# Patient Record
Sex: FEMALE | Race: WHITE | Employment: FULL TIME | URBAN - METROPOLITAN AREA
[De-identification: names, ages, dates, MRNs, and addresses within clinical notes are randomized per-mention and may not be internally consistent; named-entity substitution may affect disease eponyms.]

---

## 2022-03-19 PROBLEM — E66.9 OBESITY: Status: ACTIVE | Noted: 2021-08-04

## 2022-03-19 PROBLEM — O41.00X0 OLIGOHYDRAMNIOS: Status: ACTIVE | Noted: 2017-12-20

## 2022-03-20 PROBLEM — D68.51 FACTOR 5 LEIDEN MUTATION, HETEROZYGOUS (HCC): Status: ACTIVE | Noted: 2021-08-04

## 2022-10-19 LAB
ABO, EXTERNAL RESULT: NORMAL
C. TRACHOMATIS, EXTERNAL RESULT: NEGATIVE
HEP B, EXTERNAL RESULT: NON REACTIVE
HEPATITIS C ANTIBODY, EXTERNAL RESULT: NEGATIVE
HIV, EXTERNAL RESULT: NON REACTIVE
N. GONORRHOEAE, EXTERNAL RESULT: NEGATIVE
RH FACTOR, EXTERNAL RESULT: NEGATIVE
RUBELLA TITER, EXTERNAL RESULT: NORMAL

## 2023-04-02 ENCOUNTER — HOSPITAL ENCOUNTER (EMERGENCY)
Age: 33
Discharge: HOME | End: 2023-04-02
Attending: STUDENT IN AN ORGANIZED HEALTH CARE EDUCATION/TRAINING PROGRAM
Payer: COMMERCIAL

## 2023-04-02 DIAGNOSIS — K08.89 DENTALGIA: Primary | ICD-10-CM

## 2023-04-02 PROCEDURE — 74011000250 HC RX REV CODE- 250: Performed by: PHYSICIAN ASSISTANT

## 2023-04-02 PROCEDURE — 75810000283 HC INJECTION NERVE BLOCK

## 2023-04-02 PROCEDURE — 99283 EMERGENCY DEPT VISIT LOW MDM: CPT

## 2023-04-02 RX ORDER — OXYCODONE HYDROCHLORIDE 5 MG/1
5 TABLET ORAL
Qty: 4 TABLET | Refills: 0 | Status: SHIPPED | OUTPATIENT
Start: 2023-04-02 | End: 2023-04-06

## 2023-04-02 RX ORDER — BUPIVACAINE HYDROCHLORIDE 5 MG/ML
5 INJECTION, SOLUTION EPIDURAL; INTRACAUDAL
Status: COMPLETED | OUTPATIENT
Start: 2023-04-02 | End: 2023-04-02

## 2023-04-02 RX ADMIN — BUPIVACAINE HYDROCHLORIDE 25 MG: 5 INJECTION, SOLUTION EPIDURAL; INTRACAUDAL at 11:15

## 2023-04-02 NOTE — ED PROVIDER NOTES
[facility] EMERGENCY DEPT  EMERGENCY DEPARTMENT ENCOUNTER       Pt Name: Flor Bruno  MRN: [id number]  [place] 1990  Date of evaluation: 4/2/2023  Provider: BRAYDEN Mitchell   PCP: Leana Couch MD  Note Started: 10:56 AM 4/2/23     CHIEF COMPLAINT       Chief Complaint   Patient presents with    Dental Pain     Patient reports pain to the upper left teeth, temperature sensitive. HISTORY OF PRESENT ILLNESS: 1 or more elements      History From: Patient  HPI Limitations : None     Flor Bruno is a 34 y.o. female who presents BIB self with CC of left upper dental pain that has been worsening since Thursday. Pain is described as shocklike\" nervelike\" in the left upper premolars, radiating to the entire left side of her face. She also notes extreme hot and cold sensitivity. She is able to tolerate p.o. but the temperature sensitivity makes eating difficult. Denies fevers. Initially the pain was tolerable and controlled with Tylenol. Over the last 2 days the patient reports unbearable pain, unimproved with Tylenol, keeping her awake at night. She reports sleeping only 3 hours over the weekend due to the severity of the pain. She went to emergency dental clinic today but was told they could not doing anything for her as she is 33 weeks pregnant and also on Lovenox for factor V Leiden mutation. Nursing Notes were all reviewed and agreed with or any disagreements were addressed in the HPI. REVIEW OF SYSTEMS      Review of Systems   HENT:  Positive for dental problem. Positives and Pertinent negatives as per HPI.     PAST HISTORY     Past Medical History:  Past Medical History:   Diagnosis Date    Fluid retention in legs     Herpes simplex virus (HSV) infection     PCOS (polycystic ovarian syndrome)        Past Surgical History:  Past Surgical History:   Procedure Laterality Date    HX WISDOM TEETH EXTRACTION         Family History:  Family History   Problem Relation Age of Onset    Hypertension Mother     Other Father        Social History:  Social History     Tobacco Use    Smoking status: Never     Passive exposure: Never    Smokeless tobacco: Never   Vaping Use    Vaping Use: Never used   Substance Use Topics    Alcohol use: No     Comment: \"rare\"    Drug use: Yes     Types: Marijuana       Allergies:  No Known Allergies    CURRENT MEDICATIONS      Discharge Medication List as of 4/2/2023 12:22 PM        CONTINUE these medications which have NOT CHANGED    Details   drospirenone-ethinyl estradioL (NAZARIO) 3-0.02 mg tab Linda (28) 3 mg-0.02 mg tablet   TAKE ONE TABLET BY MOUTH ONE TIME DAILY, Historical Med      drospirenone, contraceptive, (Slynd) 4 mg (28) tab Slynd 4 mg (28) tablet   Take 1 tablet every day by oral route., Historical Med      valACYclovir (VALTREX) 1 gram tablet valacyclovir 1 gram tablet   Take 1 tablet every day by oral route for 5 days. , Historical Med      furosemide (LASIX) 20 mg tablet TAKE 1 TABLET BY MOUTH DAILY AS NEEDED FOR SWELLING, Normal, Disp-30 Tablet, R-2             PHYSICAL EXAM      ED Triage Vitals [04/02/23 1047]   ED Encounter Vitals Group      BP (!) 147/88      Pulse (Heart Rate) 82      Resp Rate 18      Temp 98.6 °F (37 °C)      Temp src       O2 Sat (%) 100 %      Weight 272 lb 0.8 oz      Height 5' 9\"        Physical Exam  Vitals and nursing note reviewed. Constitutional:       General: She is in acute distress (Tearful, in pain). HENT:      Head: Normocephalic and atraumatic. Nose: Nose normal.      Mouth/Throat:      Mouth: Mucous membranes are moist.      Pharynx: Oropharynx is clear. Comments: TTP at tooth #12 and 13. No significant gingival erythema or edema. No drainable abscess. Eyes:      Extraocular Movements: Extraocular movements intact.       Conjunctiva/sclera: Conjunctivae normal.   Pulmonary:      Effort: Pulmonary effort is normal.   Musculoskeletal:      Cervical back: Normal range of motion and neck supple. Skin:     General: Skin is warm and dry. Neurological:      General: No focal deficit present. Mental Status: She is alert and oriented to person, place, and time. Psychiatric:         Mood and Affect: Mood normal.         Behavior: Behavior normal.        DIAGNOSTIC RESULTS   LABS:     No results found for this or any previous visit (from the past 12 hour(s)). EKG: When ordered, EKG's are interpreted by the Emergency Department Physician in the absence of a cardiologist.  Please see their note for interpretation of EKG. RADIOLOGY:  Non-plain film images such as CT, Ultrasound and MRI are read by the radiologist. Plain radiographic images are visualized and preliminarily interpreted by the ED Provider with the below findings:          Interpretation per the Radiologist below, if available at the time of this note:     No results found. PROCEDURES   Unless otherwise noted below, none  Nerve Block    Date/Time: 4/2/2023 12:00 PM  Performed by: Jacinda Centeno  Authorized by: Jacinda Centeno     Consent:     Consent obtained:  Verbal    Consent given by:  Patient    Risks, benefits, and alternatives were discussed: yes      Risks discussed:  Unsuccessful block, pain and bleeding    Alternatives discussed:  Alternative treatment and referral  Universal protocol:     Patient identity confirmed:  Verbally with patient  Indications:     Indications:  Pain relief  Location:     Nerve block body site: Tooth #12 and tooth #13.     Laterality:  Left  Procedure details:     Block needle gauge:  27 G    Anesthetic injected:  Bupivacaine 0.5% w/o epi    Steroid injected:  None    Additive injected:  None    Injection procedure:  Anatomic landmarks identified, anatomic landmarks palpated, introduced needle, incremental injection and negative aspiration for blood    Paresthesia:  Prolonged  Post-procedure details:     Dressing:  None    Outcome:  Pain relieved    Procedure completion: Tolerated well, no immediate complications     CRITICAL CARE TIME       EMERGENCY DEPARTMENT COURSE and DIFFERENTIAL DIAGNOSIS/MDM   Vitals:    Vitals:    04/02/23 1047   BP: (!) 147/88   Pulse: 82   Resp: 18   Temp: 98.6 °F (37 °C)   SpO2: 100%   Weight: 123.4 kg (272 lb 0.8 oz)   Height: 5' 9\" (1.753 m)        Patient was given the following medications:  Medications   BUPivacaine (PF) (MARCAINE) 0.5 % (5 mg/mL) injection 25 mg (25 mg Peripheral Nerve Block Given by Provider 4/2/23 1115)       CONSULTS: (Who and What was discussed)  None    Chronic Conditions: Pregnancy, anticoagulation for factor V Leiden mutation    Social Determinants affecting Dx or Tx: None    Records Reviewed (source and summary of external records): None    CC/HPI Summary, DDx, ED Course, and Reassessment:     51-year-old female pregnant at 35 weeks gestation anticoagulated on Lovenox presents with worsening dental pain of the last few days. She is tearful and appears very uncomfortable on exam.  No obvious infection. She is agreeable to a dental block. ED Course as of 04/02/23 1637   Sun Apr 02, 2023   1156 Patient reassessed. She is reporting complete symptom resolution after bupivacaine injection. We will discharge the patient with plans for outpatient dental follow-up tomorrow. We discussed pain control for when this dental block wears off. I encouraged her to try 1 g of Tylenol. If this is not sufficient, she may take an oxycodone as needed. We discussed the risk/benefits just side effects of narcotics in pregnancy. We will defer antibiotics at this time as there is no obvious source of infection and the patient is confident she will be able to see her dentist tomorrow.  [AS]      ED Course User Index  [AS] BRAYDEN Mcallister       Disposition Considerations (Tests not done, Shared Decision Making, Pt Expectation of Test or Tx.):      FINAL IMPRESSION     1.  Dentalgia          DISPOSITION/PLAN   Discharged    Discharge Note: The patient is stable for discharge home. The signs, symptoms, diagnosis, and discharge instructions have been discussed, understanding conveyed, and agreed upon. The patient is to follow up as recommended or return to ER should their symptoms worsen. PATIENT REFERRED TO:  Follow-up Information       Follow up With Specialties Details Why Contact Info    [facility] EMERGENCY DEPT Emergency Medicine  As needed, If symptoms worsen [address] [place] [address]  [phone]    Your Dentist  Call  For follow up               DISCHARGE MEDICATIONS:  Discharge Medication List as of 4/2/2023 12:22 PM        START taking these medications    Details   oxyCODONE IR (ROXICODONE) 5 mg immediate release tablet Take 1 Tablet by mouth every six (6) hours as needed for Pain for up to 4 days. Max Daily Amount: 20 mg., Normal, Disp-4 Tablet, R-0           CONTINUE these medications which have NOT CHANGED    Details   drospirenone-ethinyl estradioL (NAZARIO) 3-0.02 mg tab Linda (28) 3 mg-0.02 mg tablet   TAKE ONE TABLET BY MOUTH ONE TIME DAILY, Historical Med      drospirenone, contraceptive, (Slynd) 4 mg (28) tab Slynd 4 mg (28) tablet   Take 1 tablet every day by oral route., Historical Med      valACYclovir (VALTREX) 1 gram tablet valacyclovir 1 gram tablet   Take 1 tablet every day by oral route for 5 days. , Historical Med      furosemide (LASIX) 20 mg tablet TAKE 1 TABLET BY MOUTH DAILY AS NEEDED FOR SWELLING, Normal, Disp-30 Tablet, R-2               DISCONTINUED MEDICATIONS:  Discharge Medication List as of 4/2/2023 12:22 PM          ED Attending Involvement : I have seen and evaluated the patient. My supervision physician was available for consultation. I am the Primary Clinician of Record. Raghav Robles (electronically signed)    (Please note that parts of this dictation were completed with voice recognition software.  Quite often unanticipated grammatical, syntax, homophones, and other interpretive errors are inadvertently transcribed by the computer software. Please disregards these errors.  Please excuse any errors that have escaped final proofreading.)

## 2023-04-25 LAB — GBS, EXTERNAL RESULT: POSITIVE

## 2023-05-16 ENCOUNTER — HOSPITAL ENCOUNTER (INPATIENT)
Facility: HOSPITAL | Age: 33
LOS: 4 days | Discharge: HOME | End: 2023-05-20
Attending: OBSTETRICS & GYNECOLOGY | Admitting: STUDENT IN AN ORGANIZED HEALTH CARE EDUCATION/TRAINING PROGRAM
Payer: COMMERCIAL

## 2023-05-16 ENCOUNTER — ANESTHESIA EVENT (OUTPATIENT)
Dept: LABOR AND DELIVERY | Facility: HOSPITAL | Age: 33
End: 2023-05-16
Payer: COMMERCIAL

## 2023-05-16 ENCOUNTER — ANESTHESIA (OUTPATIENT)
Dept: LABOR AND DELIVERY | Facility: HOSPITAL | Age: 33
End: 2023-05-16
Payer: COMMERCIAL

## 2023-05-16 DIAGNOSIS — G89.18 POST-OP PAIN: Primary | ICD-10-CM

## 2023-05-16 PROBLEM — Z34.90 TERM PREGNANCY: Status: ACTIVE | Noted: 2023-05-16

## 2023-05-16 LAB
ABO + RH BLD: NORMAL
AMPHET UR QL SCN: NEGATIVE
BARBITURATES UR QL SCN: NEGATIVE
BASOPHILS # BLD: 0 K/UL (ref 0–0.1)
BASOPHILS NFR BLD: 0 % (ref 0–1)
BENZODIAZ UR QL: NEGATIVE
BLOOD GROUP ANTIBODIES SERPL: NORMAL
CANNABINOIDS UR QL SCN: POSITIVE
COCAINE UR QL SCN: NEGATIVE
DIFFERENTIAL METHOD BLD: ABNORMAL
EOSINOPHIL # BLD: 0.1 K/UL (ref 0–0.4)
EOSINOPHIL NFR BLD: 1 % (ref 0–7)
ERYTHROCYTE [DISTWIDTH] IN BLOOD BY AUTOMATED COUNT: 15.7 % (ref 11.5–14.5)
HCT VFR BLD AUTO: 36 % (ref 35–47)
HGB BLD-MCNC: 11.4 G/DL (ref 11.5–16)
IMM GRANULOCYTES # BLD AUTO: 0.1 K/UL (ref 0–0.04)
IMM GRANULOCYTES NFR BLD AUTO: 1 % (ref 0–0.5)
LYMPHOCYTES # BLD: 1.8 K/UL (ref 0.8–3.5)
LYMPHOCYTES NFR BLD: 16 % (ref 12–49)
Lab: ABNORMAL
MCH RBC QN AUTO: 27.7 PG (ref 26–34)
MCHC RBC AUTO-ENTMCNC: 31.7 G/DL (ref 30–36.5)
MCV RBC AUTO: 87.4 FL (ref 80–99)
METHADONE UR QL: NEGATIVE
MONOCYTES # BLD: 0.5 K/UL (ref 0–1)
MONOCYTES NFR BLD: 4 % (ref 5–13)
NEUTS SEG # BLD: 8.7 K/UL (ref 1.8–8)
NEUTS SEG NFR BLD: 78 % (ref 32–75)
NRBC # BLD: 0 K/UL (ref 0–0.01)
NRBC BLD-RTO: 0 PER 100 WBC
OPIATES UR QL: NEGATIVE
PCP UR QL: NEGATIVE
PLATELET # BLD AUTO: 220 K/UL (ref 150–400)
PMV BLD AUTO: 11.7 FL (ref 8.9–12.9)
RBC # BLD AUTO: 4.12 M/UL (ref 3.8–5.2)
SPECIMEN EXP DATE BLD: NORMAL
WBC # BLD AUTO: 11.2 K/UL (ref 3.6–11)

## 2023-05-16 PROCEDURE — 6370000000 HC RX 637 (ALT 250 FOR IP): Performed by: STUDENT IN AN ORGANIZED HEALTH CARE EDUCATION/TRAINING PROGRAM

## 2023-05-16 PROCEDURE — 1120000000 HC RM PRIVATE OB

## 2023-05-16 PROCEDURE — 86901 BLOOD TYPING SEROLOGIC RH(D): CPT

## 2023-05-16 PROCEDURE — 2580000003 HC RX 258: Performed by: STUDENT IN AN ORGANIZED HEALTH CARE EDUCATION/TRAINING PROGRAM

## 2023-05-16 PROCEDURE — 85025 COMPLETE CBC W/AUTO DIFF WBC: CPT

## 2023-05-16 PROCEDURE — 6360000002 HC RX W HCPCS: Performed by: OBSTETRICS & GYNECOLOGY

## 2023-05-16 PROCEDURE — 6360000002 HC RX W HCPCS: Performed by: ANESTHESIOLOGY

## 2023-05-16 PROCEDURE — 86900 BLOOD TYPING SEROLOGIC ABO: CPT

## 2023-05-16 PROCEDURE — 7210000100 HC LABOR FEE PER 1 HR

## 2023-05-16 PROCEDURE — 86850 RBC ANTIBODY SCREEN: CPT

## 2023-05-16 PROCEDURE — 36415 COLL VENOUS BLD VENIPUNCTURE: CPT

## 2023-05-16 PROCEDURE — 80307 DRUG TEST PRSMV CHEM ANLYZR: CPT

## 2023-05-16 PROCEDURE — 6360000002 HC RX W HCPCS: Performed by: STUDENT IN AN ORGANIZED HEALTH CARE EDUCATION/TRAINING PROGRAM

## 2023-05-16 RX ORDER — ONDANSETRON 2 MG/ML
4 INJECTION INTRAMUSCULAR; INTRAVENOUS EVERY 6 HOURS PRN
Status: DISCONTINUED | OUTPATIENT
Start: 2023-05-16 | End: 2023-05-17

## 2023-05-16 RX ORDER — NALBUPHINE HCL 10 MG/ML
10 AMPUL (ML) INJECTION EVERY 4 HOURS PRN
Status: DISCONTINUED | OUTPATIENT
Start: 2023-05-16 | End: 2023-05-17

## 2023-05-16 RX ORDER — FERROUS SULFATE 137(45) MG
142 TABLET, EXTENDED RELEASE ORAL DAILY
COMMUNITY

## 2023-05-16 RX ORDER — METHYLERGONOVINE MALEATE 0.2 MG/ML
200 INJECTION INTRAVENOUS PRN
Status: DISCONTINUED | OUTPATIENT
Start: 2023-05-16 | End: 2023-05-17

## 2023-05-16 RX ORDER — MISOPROSTOL 200 UG/1
800 TABLET ORAL PRN
Status: DISCONTINUED | OUTPATIENT
Start: 2023-05-16 | End: 2023-05-17

## 2023-05-16 RX ORDER — FAMOTIDINE 20 MG/1
20 TABLET, FILM COATED ORAL 2 TIMES DAILY
Status: DISCONTINUED | OUTPATIENT
Start: 2023-05-16 | End: 2023-05-17

## 2023-05-16 RX ORDER — ENOXAPARIN SODIUM 100 MG/ML
INJECTION SUBCUTANEOUS 2 TIMES DAILY
Status: ON HOLD | COMMUNITY
End: 2023-05-20 | Stop reason: SDUPTHER

## 2023-05-16 RX ORDER — SODIUM CHLORIDE 9 MG/ML
25 INJECTION, SOLUTION INTRAVENOUS PRN
Status: DISCONTINUED | OUTPATIENT
Start: 2023-05-16 | End: 2023-05-17

## 2023-05-16 RX ORDER — FENTANYL 0.2 MG/100ML-BUPIV 0.125%-NACL 0.9% EPIDURAL INJ 2/0.125 MCG/ML-%
12 SOLUTION INJECTION CONTINUOUS
Status: DISCONTINUED | OUTPATIENT
Start: 2023-05-17 | End: 2023-05-17

## 2023-05-16 RX ORDER — FENTANYL 0.2 MG/100ML-BUPIV 0.125%-NACL 0.9% EPIDURAL INJ 2/0.125 MCG/ML-%
SOLUTION INJECTION
Status: COMPLETED
Start: 2023-05-16 | End: 2023-05-17

## 2023-05-16 RX ORDER — ACETAMINOPHEN 325 MG/1
650 TABLET ORAL EVERY 4 HOURS PRN
Status: DISCONTINUED | OUTPATIENT
Start: 2023-05-16 | End: 2023-05-17

## 2023-05-16 RX ORDER — SODIUM CHLORIDE, SODIUM LACTATE, POTASSIUM CHLORIDE, AND CALCIUM CHLORIDE .6; .31; .03; .02 G/100ML; G/100ML; G/100ML; G/100ML
500 INJECTION, SOLUTION INTRAVENOUS PRN
Status: DISCONTINUED | OUTPATIENT
Start: 2023-05-16 | End: 2023-05-17

## 2023-05-16 RX ORDER — SODIUM CHLORIDE 0.9 % (FLUSH) 0.9 %
5-40 SYRINGE (ML) INJECTION PRN
Status: DISCONTINUED | OUTPATIENT
Start: 2023-05-16 | End: 2023-05-17

## 2023-05-16 RX ORDER — SODIUM CHLORIDE 0.9 % (FLUSH) 0.9 %
5-40 SYRINGE (ML) INJECTION EVERY 12 HOURS SCHEDULED
Status: DISCONTINUED | OUTPATIENT
Start: 2023-05-16 | End: 2023-05-17

## 2023-05-16 RX ORDER — BUPIVACAINE HYDROCHLORIDE AND EPINEPHRINE 2.5; 5 MG/ML; UG/ML
INJECTION, SOLUTION EPIDURAL; INFILTRATION; INTRACAUDAL; PERINEURAL
Status: COMPLETED
Start: 2023-05-16 | End: 2023-05-17

## 2023-05-16 RX ORDER — SODIUM CHLORIDE, SODIUM LACTATE, POTASSIUM CHLORIDE, AND CALCIUM CHLORIDE .6; .31; .03; .02 G/100ML; G/100ML; G/100ML; G/100ML
1000 INJECTION, SOLUTION INTRAVENOUS PRN
Status: DISCONTINUED | OUTPATIENT
Start: 2023-05-16 | End: 2023-05-17

## 2023-05-16 RX ORDER — BUTORPHANOL TARTRATE 1 MG/ML
1 INJECTION, SOLUTION INTRAMUSCULAR; INTRAVENOUS
Status: DISCONTINUED | OUTPATIENT
Start: 2023-05-16 | End: 2023-05-17

## 2023-05-16 RX ORDER — SODIUM CHLORIDE, SODIUM LACTATE, POTASSIUM CHLORIDE, CALCIUM CHLORIDE 600; 310; 30; 20 MG/100ML; MG/100ML; MG/100ML; MG/100ML
INJECTION, SOLUTION INTRAVENOUS CONTINUOUS
Status: DISCONTINUED | OUTPATIENT
Start: 2023-05-16 | End: 2023-05-17

## 2023-05-16 RX ORDER — DOCUSATE SODIUM 100 MG/1
100 CAPSULE, LIQUID FILLED ORAL 2 TIMES DAILY
Status: DISCONTINUED | OUTPATIENT
Start: 2023-05-16 | End: 2023-05-17

## 2023-05-16 RX ORDER — ASPIRIN 81 MG/1
81 TABLET, CHEWABLE ORAL DAILY
Status: ON HOLD | COMMUNITY
End: 2023-05-20 | Stop reason: HOSPADM

## 2023-05-16 RX ORDER — NALOXONE HYDROCHLORIDE 0.4 MG/ML
INJECTION, SOLUTION INTRAMUSCULAR; INTRAVENOUS; SUBCUTANEOUS PRN
Status: DISCONTINUED | OUTPATIENT
Start: 2023-05-16 | End: 2023-05-17

## 2023-05-16 RX ORDER — CARBOPROST TROMETHAMINE 250 UG/ML
250 INJECTION, SOLUTION INTRAMUSCULAR PRN
Status: DISCONTINUED | OUTPATIENT
Start: 2023-05-16 | End: 2023-05-17

## 2023-05-16 RX ADMIN — FAMOTIDINE 20 MG: 20 TABLET, FILM COATED ORAL at 21:17

## 2023-05-16 RX ADMIN — SODIUM CHLORIDE 2.5 MILLION UNITS: 9 INJECTION, SOLUTION INTRAVENOUS at 21:18

## 2023-05-16 RX ADMIN — ONDANSETRON 4 MG: 2 INJECTION INTRAMUSCULAR; INTRAVENOUS at 22:50

## 2023-05-16 RX ADMIN — SODIUM CHLORIDE, POTASSIUM CHLORIDE, SODIUM LACTATE AND CALCIUM CHLORIDE 1000 ML: 600; 310; 30; 20 INJECTION, SOLUTION INTRAVENOUS at 23:15

## 2023-05-16 RX ADMIN — ONDANSETRON 4 MG: 2 INJECTION INTRAMUSCULAR; INTRAVENOUS at 23:20

## 2023-05-16 RX ADMIN — ONDANSETRON 4 MG: 2 INJECTION INTRAMUSCULAR; INTRAVENOUS at 18:07

## 2023-05-16 RX ADMIN — ACETAMINOPHEN 650 MG: 325 TABLET ORAL at 18:17

## 2023-05-16 RX ADMIN — NALBUPHINE HYDROCHLORIDE 10 MG: 10 INJECTION, SOLUTION INTRAMUSCULAR; INTRAVENOUS; SUBCUTANEOUS at 20:32

## 2023-05-16 RX ADMIN — SODIUM CHLORIDE 5 MILLION UNITS: 900 INJECTION INTRAVENOUS at 18:00

## 2023-05-16 RX ADMIN — DOCUSATE SODIUM 100 MG: 100 CAPSULE, LIQUID FILLED ORAL at 21:17

## 2023-05-16 ASSESSMENT — PAIN DESCRIPTION - ORIENTATION
ORIENTATION: LOWER
ORIENTATION: LOWER

## 2023-05-16 ASSESSMENT — PAIN DESCRIPTION - LOCATION
LOCATION: ABDOMEN
LOCATION: ABDOMEN;BACK

## 2023-05-16 ASSESSMENT — PAIN SCALES - GENERAL
PAINLEVEL_OUTOF10: 6
PAINLEVEL_OUTOF10: 6

## 2023-05-16 ASSESSMENT — PAIN DESCRIPTION - DESCRIPTORS
DESCRIPTORS: ACHING;CRAMPING;SHARP;SHOOTING
DESCRIPTORS: CRAMPING

## 2023-05-16 NOTE — H&P
Department of Obstetrics and Gynecology  Attending Obstetrics History and Physical        CHIEF COMPLAINT:  IOL     HISTORY OF PRESENT ILLNESS:      This is a 34 y.o.  39w3d presenting for IOL for oligohydramnios. JOSSELYN noted to be 4.5 in the office today. Denies LoF or VB. No ctx. Pregnancy problems:   HSV- compliant with Valtrex  Obesity   Factor V Leiden- stopped lovenox Saturday, stopped ASA last night   Rh neg  GBS pos no allergies    Prenatal record reviewed from 10/19/22-23        PAST OB HISTORY    OB History    Para Term  AB Living   3 1 1   1 1   SAB IAB Ectopic Molar Multiple Live Births   1         1      # Outcome Date GA Lbr Jayden/2nd Weight Sex Delivery Anes PTL Lv   3 Current            2 Term 17 40w3d   F Vag-Spont   [facility]   1 SAB 10/2016                 Past Medical History:        Diagnosis Date    Anemia     w. pregnancy    Factor V Leiden ([facility] [address]     Fluid retention in legs     Herpes simplex virus (HSV) infection     PCOS (polycystic ovarian syndrome)      Past Surgical History:        Procedure Laterality Date    WISDOM TOOTH EXTRACTION       Family History:       Problem Relation Age of Onset    Hypertension Mother     Other Father      Medications Prior to Admission:  Medications Prior to Admission: aspirin 81 MG chewable tablet, Take 1 tablet by mouth daily  ferrous sulfate (SLOW FE) 142 (45 Fe) MG extended release tablet, Take 142 mg by mouth daily  enoxaparin (LOVENOX) 40 MG/0.4ML, Inject into the skin 2 times daily  Drospirenone (SLYND) 4 MG TABS, Slynd 4 mg (28) tablet  Take 1 tablet every day by oral route.   drospirenone-ethinyl estradiol (KELLY) 3-0.02 MG per tablet, Esperanza (28) 3 mg-0.02 mg tablet  TAKE ONE TABLET BY MOUTH ONE TIME DAILY  furosemide (LASIX) 20 MG tablet, TAKE 1 TABLET BY MOUTH DAILY AS NEEDED FOR SWELLING (Patient not taking: Reported on 2023)  valACYclovir (VALTREX) 1 g tablet, valacyclovir 1 gram tablet  Take 1 tablet every day by oral

## 2023-05-17 LAB
ERYTHROCYTE [DISTWIDTH] IN BLOOD BY AUTOMATED COUNT: 15.6 % (ref 11.5–14.5)
HCT VFR BLD AUTO: 31 % (ref 35–47)
HGB BLD-MCNC: 9.8 G/DL (ref 11.5–16)
MCH RBC QN AUTO: 27.3 PG (ref 26–34)
MCHC RBC AUTO-ENTMCNC: 31.6 G/DL (ref 30–36.5)
MCV RBC AUTO: 86.4 FL (ref 80–99)
NRBC # BLD: 0 K/UL (ref 0–0.01)
NRBC BLD-RTO: 0 PER 100 WBC
PLATELET # BLD AUTO: 173 K/UL (ref 150–400)
PMV BLD AUTO: 11.8 FL (ref 8.9–12.9)
RBC # BLD AUTO: 3.59 M/UL (ref 3.8–5.2)
WBC # BLD AUTO: 16.8 K/UL (ref 3.6–11)

## 2023-05-17 PROCEDURE — 2580000003 HC RX 258: Performed by: OBSTETRICS & GYNECOLOGY

## 2023-05-17 PROCEDURE — 3700000001 HC ADD 15 MINUTES (ANESTHESIA): Performed by: OBSTETRICS & GYNECOLOGY

## 2023-05-17 PROCEDURE — 3700000156 HC EPIDURAL ANESTHESIA

## 2023-05-17 PROCEDURE — 6360000002 HC RX W HCPCS: Performed by: OBSTETRICS & GYNECOLOGY

## 2023-05-17 PROCEDURE — 7100000000 HC PACU RECOVERY - FIRST 15 MIN

## 2023-05-17 PROCEDURE — 7100000001 HC PACU RECOVERY - ADDTL 15 MIN: Performed by: OBSTETRICS & GYNECOLOGY

## 2023-05-17 PROCEDURE — 2500000003 HC RX 250 WO HCPCS: Performed by: ANESTHESIOLOGY

## 2023-05-17 PROCEDURE — 3700000025 EPIDURAL BLOCK: Performed by: ANESTHESIOLOGY

## 2023-05-17 PROCEDURE — 6360000002 HC RX W HCPCS

## 2023-05-17 PROCEDURE — 7100000000 HC PACU RECOVERY - FIRST 15 MIN: Performed by: OBSTETRICS & GYNECOLOGY

## 2023-05-17 PROCEDURE — 6360000002 HC RX W HCPCS: Performed by: ANESTHESIOLOGY

## 2023-05-17 PROCEDURE — 2580000003 HC RX 258: Performed by: STUDENT IN AN ORGANIZED HEALTH CARE EDUCATION/TRAINING PROGRAM

## 2023-05-17 PROCEDURE — 7210000100 HC LABOR FEE PER 1 HR

## 2023-05-17 PROCEDURE — C1713 ANCHOR/SCREW BN/BN,TIS/BN: HCPCS | Performed by: OBSTETRICS & GYNECOLOGY

## 2023-05-17 PROCEDURE — 36415 COLL VENOUS BLD VENIPUNCTURE: CPT

## 2023-05-17 PROCEDURE — 2709999900 HC NON-CHARGEABLE SUPPLY: Performed by: OBSTETRICS & GYNECOLOGY

## 2023-05-17 PROCEDURE — 59200 INSERT CERVICAL DILATOR: CPT

## 2023-05-17 PROCEDURE — 3609079900 HC CESAREAN SECTION: Performed by: OBSTETRICS & GYNECOLOGY

## 2023-05-17 PROCEDURE — 85027 COMPLETE CBC AUTOMATED: CPT

## 2023-05-17 PROCEDURE — 7100000001 HC PACU RECOVERY - ADDTL 15 MIN

## 2023-05-17 PROCEDURE — 3700000000 HC ANESTHESIA ATTENDED CARE: Performed by: OBSTETRICS & GYNECOLOGY

## 2023-05-17 PROCEDURE — 6360000002 HC RX W HCPCS: Performed by: STUDENT IN AN ORGANIZED HEALTH CARE EDUCATION/TRAINING PROGRAM

## 2023-05-17 PROCEDURE — 99465 NB RESUSCITATION: CPT

## 2023-05-17 PROCEDURE — 2500000003 HC RX 250 WO HCPCS

## 2023-05-17 PROCEDURE — 1120000000 HC RM PRIVATE OB

## 2023-05-17 PROCEDURE — 6370000000 HC RX 637 (ALT 250 FOR IP): Performed by: OBSTETRICS & GYNECOLOGY

## 2023-05-17 RX ORDER — FUROSEMIDE 10 MG/ML
20 INJECTION INTRAMUSCULAR; INTRAVENOUS ONCE
Status: COMPLETED | OUTPATIENT
Start: 2023-05-17 | End: 2023-05-17

## 2023-05-17 RX ORDER — SODIUM CHLORIDE, SODIUM LACTATE, POTASSIUM CHLORIDE, CALCIUM CHLORIDE 600; 310; 30; 20 MG/100ML; MG/100ML; MG/100ML; MG/100ML
INJECTION, SOLUTION INTRAVENOUS CONTINUOUS
Status: DISCONTINUED | OUTPATIENT
Start: 2023-05-17 | End: 2023-05-19

## 2023-05-17 RX ORDER — BUPIVACAINE HYDROCHLORIDE AND EPINEPHRINE 2.5; 5 MG/ML; UG/ML
INJECTION, SOLUTION EPIDURAL; INFILTRATION; INTRACAUDAL; PERINEURAL PRN
Status: DISCONTINUED | OUTPATIENT
Start: 2023-05-17 | End: 2023-05-17 | Stop reason: SDUPTHER

## 2023-05-17 RX ORDER — OXYCODONE HYDROCHLORIDE 5 MG/1
5 TABLET ORAL EVERY 4 HOURS PRN
Status: DISCONTINUED | OUTPATIENT
Start: 2023-05-17 | End: 2023-05-20 | Stop reason: HOSPADM

## 2023-05-17 RX ORDER — LANOLIN/MINERAL OIL
LOTION (ML) TOPICAL
Status: DISCONTINUED | OUTPATIENT
Start: 2023-05-17 | End: 2023-05-20 | Stop reason: HOSPADM

## 2023-05-17 RX ORDER — ENOXAPARIN SODIUM 100 MG/ML
30 INJECTION SUBCUTANEOUS EVERY 12 HOURS
Status: DISCONTINUED | OUTPATIENT
Start: 2023-05-18 | End: 2023-05-20 | Stop reason: HOSPADM

## 2023-05-17 RX ORDER — CEFAZOLIN SODIUM 1 G/3ML
INJECTION, POWDER, FOR SOLUTION INTRAMUSCULAR; INTRAVENOUS
Status: COMPLETED
Start: 2023-05-17 | End: 2023-05-17

## 2023-05-17 RX ORDER — DOCUSATE SODIUM 100 MG/1
100 CAPSULE, LIQUID FILLED ORAL 2 TIMES DAILY
Status: DISCONTINUED | OUTPATIENT
Start: 2023-05-17 | End: 2023-05-20 | Stop reason: HOSPADM

## 2023-05-17 RX ORDER — PHENYLEPHRINE HYDROCHLORIDE 10 MG/ML
INJECTION INTRAVENOUS PRN
Status: DISCONTINUED | OUTPATIENT
Start: 2023-05-17 | End: 2023-05-17 | Stop reason: SDUPTHER

## 2023-05-17 RX ORDER — SODIUM CHLORIDE 9 MG/ML
INJECTION, SOLUTION INTRAVENOUS PRN
Status: DISCONTINUED | OUTPATIENT
Start: 2023-05-17 | End: 2023-05-19

## 2023-05-17 RX ORDER — TERBUTALINE SULFATE 1 MG/ML
0.25 INJECTION, SOLUTION SUBCUTANEOUS ONCE
Status: COMPLETED | OUTPATIENT
Start: 2023-05-17 | End: 2023-05-17

## 2023-05-17 RX ORDER — SODIUM CHLORIDE 0.9 % (FLUSH) 0.9 %
5-40 SYRINGE (ML) INJECTION PRN
Status: DISCONTINUED | OUTPATIENT
Start: 2023-05-17 | End: 2023-05-19

## 2023-05-17 RX ORDER — IBUPROFEN 400 MG/1
800 TABLET ORAL EVERY 8 HOURS PRN
Status: DISCONTINUED | OUTPATIENT
Start: 2023-05-17 | End: 2023-05-20 | Stop reason: HOSPADM

## 2023-05-17 RX ORDER — LIDOCAINE HYDROCHLORIDE AND EPINEPHRINE 20; 5 MG/ML; UG/ML
INJECTION, SOLUTION EPIDURAL; INFILTRATION; INTRACAUDAL; PERINEURAL PRN
Status: DISCONTINUED | OUTPATIENT
Start: 2023-05-17 | End: 2023-05-17 | Stop reason: SDUPTHER

## 2023-05-17 RX ORDER — ACETAMINOPHEN 325 MG/1
650 TABLET ORAL EVERY 4 HOURS PRN
Status: DISCONTINUED | OUTPATIENT
Start: 2023-05-17 | End: 2023-05-20 | Stop reason: HOSPADM

## 2023-05-17 RX ORDER — SODIUM CHLORIDE 0.9 % (FLUSH) 0.9 %
5-40 SYRINGE (ML) INJECTION EVERY 12 HOURS SCHEDULED
Status: DISCONTINUED | OUTPATIENT
Start: 2023-05-17 | End: 2023-05-19

## 2023-05-17 RX ADMIN — PHENYLEPHRINE HYDROCHLORIDE 40 MCG: 10 INJECTION INTRAVENOUS at 10:48

## 2023-05-17 RX ADMIN — BUPIVACAINE HYDROCHLORIDE AND EPINEPHRINE 5 ML: 2.5; 5 INJECTION, SOLUTION EPIDURAL; INFILTRATION; INTRACAUDAL; PERINEURAL at 00:17

## 2023-05-17 RX ADMIN — PROMETHAZINE HYDROCHLORIDE 12.5 MG: 25 INJECTION INTRAMUSCULAR; INTRAVENOUS at 12:07

## 2023-05-17 RX ADMIN — CEFAZOLIN 2 MG: 1 INJECTION, POWDER, FOR SOLUTION INTRAMUSCULAR; INTRAVENOUS; PARENTERAL at 10:27

## 2023-05-17 RX ADMIN — ACETAMINOPHEN 650 MG: 325 TABLET ORAL at 15:58

## 2023-05-17 RX ADMIN — LIDOCAINE HYDROCHLORIDE,EPINEPHRINE BITARTRATE 10 ML: 20; .005 INJECTION, SOLUTION EPIDURAL; INFILTRATION; INTRACAUDAL; PERINEURAL at 10:27

## 2023-05-17 RX ADMIN — SODIUM CHLORIDE, POTASSIUM CHLORIDE, SODIUM LACTATE AND CALCIUM CHLORIDE: 600; 310; 30; 20 INJECTION, SOLUTION INTRAVENOUS at 10:26

## 2023-05-17 RX ADMIN — Medication 12 ML/HR: at 07:18

## 2023-05-17 RX ADMIN — Medication 12 ML/HR: at 00:35

## 2023-05-17 RX ADMIN — SODIUM CHLORIDE, POTASSIUM CHLORIDE, SODIUM LACTATE AND CALCIUM CHLORIDE: 600; 310; 30; 20 INJECTION, SOLUTION INTRAVENOUS at 15:00

## 2023-05-17 RX ADMIN — FUROSEMIDE 20 MG: 10 INJECTION, SOLUTION INTRAMUSCULAR; INTRAVENOUS at 21:38

## 2023-05-17 RX ADMIN — ONDANSETRON 4 MG: 2 INJECTION INTRAMUSCULAR; INTRAVENOUS at 09:43

## 2023-05-17 RX ADMIN — OXYTOCIN 1 MILLI-UNITS/MIN: 10 INJECTION INTRAVENOUS at 03:05

## 2023-05-17 RX ADMIN — SODIUM CHLORIDE 2.5 MILLION UNITS: 9 INJECTION, SOLUTION INTRAVENOUS at 05:23

## 2023-05-17 RX ADMIN — TERBUTALINE SULFATE 0.25 MG: 1 INJECTION SUBCUTANEOUS at 10:19

## 2023-05-17 RX ADMIN — SODIUM CHLORIDE 2.5 MILLION UNITS: 9 INJECTION, SOLUTION INTRAVENOUS at 09:43

## 2023-05-17 RX ADMIN — LIDOCAINE HYDROCHLORIDE,EPINEPHRINE BITARTRATE 10 ML: 20; .005 INJECTION, SOLUTION EPIDURAL; INFILTRATION; INTRACAUDAL; PERINEURAL at 10:28

## 2023-05-17 RX ADMIN — PROMETHAZINE HYDROCHLORIDE 12.5 MG: 25 INJECTION INTRAMUSCULAR; INTRAVENOUS at 01:05

## 2023-05-17 RX ADMIN — SODIUM CHLORIDE, POTASSIUM CHLORIDE, SODIUM LACTATE AND CALCIUM CHLORIDE 500 ML: 600; 310; 30; 20 INJECTION, SOLUTION INTRAVENOUS at 03:07

## 2023-05-17 RX ADMIN — AZITHROMYCIN MONOHYDRATE 500 MG: 500 INJECTION, POWDER, LYOPHILIZED, FOR SOLUTION INTRAVENOUS at 10:27

## 2023-05-17 RX ADMIN — PHENYLEPHRINE HYDROCHLORIDE 40 MCG: 10 INJECTION INTRAVENOUS at 10:32

## 2023-05-17 RX ADMIN — BUPIVACAINE HYDROCHLORIDE AND EPINEPHRINE 3 ML: 2.5; 5 INJECTION, SOLUTION EPIDURAL; INFILTRATION; INTRACAUDAL; PERINEURAL at 00:14

## 2023-05-17 RX ADMIN — Medication 909 ML/HR: at 10:38

## 2023-05-17 RX ADMIN — PROMETHAZINE HYDROCHLORIDE 12.5 MG: 25 INJECTION, SOLUTION INTRAMUSCULAR; INTRAVENOUS at 17:15

## 2023-05-17 RX ADMIN — SODIUM CHLORIDE 2.5 MILLION UNITS: 9 INJECTION, SOLUTION INTRAVENOUS at 00:47

## 2023-05-17 RX ADMIN — IBUPROFEN 800 MG: 400 TABLET, FILM COATED ORAL at 15:58

## 2023-05-17 RX ADMIN — BUPIVACAINE HYDROCHLORIDE AND EPINEPHRINE 5 ML: 2.5; 5 INJECTION, SOLUTION EPIDURAL; INFILTRATION; INTRACAUDAL; PERINEURAL at 00:15

## 2023-05-17 ASSESSMENT — PAIN DESCRIPTION - DESCRIPTORS: DESCRIPTORS: SORE

## 2023-05-17 ASSESSMENT — PAIN SCALES - GENERAL: PAINLEVEL_OUTOF10: 5

## 2023-05-17 ASSESSMENT — PAIN DESCRIPTION - LOCATION: LOCATION: ABDOMEN;INCISION

## 2023-05-17 ASSESSMENT — PAIN DESCRIPTION - ORIENTATION: ORIENTATION: LOWER

## 2023-05-17 NOTE — ANESTHESIA PRE PROCEDURE
Department of Anesthesiology  Preprocedure Note       Name:  Aubree Billingsley   Age:  34 y.o.  :  1990                                          MRN:  [id number]         Date:  2023      Surgeon: * No surgeons listed *    Procedure: * No procedures listed *    Medications prior to admission:   Prior to Admission medications    Medication Sig Start Date End Date Taking? Authorizing Provider   aspirin 81 MG chewable tablet Take 1 tablet by mouth daily   Yes Historical Provider, MD   ferrous sulfate (SLOW FE) 142 (45 Fe) MG extended release tablet Take 142 mg by mouth daily   Yes Historical Provider, MD   enoxaparin (LOVENOX) 40 MG/0.4ML Inject into the skin 2 times daily   Yes Historical Provider, MD   Drospirenone (SLYND) 4 MG TABS Slynd 4 mg (28) tablet   Take 1 tablet every day by oral route. Ar Automatic Reconciliation   drospirenone-ethinyl estradiol (KELLY) 3-0.02 MG per tablet Esperanza (28) 3 mg-0.02 mg tablet   TAKE ONE TABLET BY MOUTH ONE TIME DAILY    Ar Automatic Reconciliation   furosemide (LASIX) 20 MG tablet TAKE 1 TABLET BY MOUTH DAILY AS NEEDED FOR SWELLING  Patient not taking: Reported on 2023   Ar Automatic Reconciliation   valACYclovir (VALTREX) 1 g tablet valacyclovir 1 gram tablet   Take 1 tablet every day by oral route for 5 days.     Ar Automatic Reconciliation       Current medications:    Current Facility-Administered Medications   Medication Dose Route Frequency Provider Last Rate Last Admin    lactated ringers IV soln infusion   IntraVENous Continuous Reyna Gray MD        lactated ringers bolus  500 mL IntraVENous PRN Reyna Gray MD        Or    lactated ringers bolus  1,000 mL IntraVENous PRN Reyna Gray .9 mL/hr at 23 2315 1,000 mL at 23 2315    sodium chloride flush 0.9 % injection 5-40 mL  5-40 mL IntraVENous 2 times per day Reyna Gray MD        sodium chloride flush 0.9 % injection 5-40 mL  5-40 mL IntraVENous PRN Case Henley

## 2023-05-17 NOTE — ANESTHESIA POSTPROCEDURE EVALUATION
Department of Anesthesiology  Postprocedure Note    Patient: Mittie Castleman  MRN: [id number]  YOB: 1990  Date of evaluation: 2023      Procedure Summary     Date: 23 Room / Location: [facility] L&D 01 / [facility] L&D OR    Anesthesia Start:  Anesthesia Stop: 23 1117    Procedures:        SECTION (Abdomen)      Labor Analgesia Diagnosis:       Fetal intolerance to labor, delivered, current hospitalization      (Fetal Intolerance of Labor)    Surgeons: Vickie Antonio MD Responsible Provider: Masoud Aguilar MD    Anesthesia Type: epidural ASA Status: 2          Anesthesia Type: No value filed.     Lalit Phase I:      Lalit Phase II:        Anesthesia Post Evaluation    Patient location during evaluation: PACU  Patient participation: complete - patient participated  Level of consciousness: sleepy but conscious and responsive to verbal stimuli  Pain score: 0  Airway patency: patent  Nausea & Vomiting: no vomiting and no nausea  Complications: no  Cardiovascular status: blood pressure returned to baseline and hemodynamically stable  Respiratory status: acceptable  Hydration status: stable  Multimodal analgesia pain management approach

## 2023-05-17 NOTE — PROGRESS NOTES
Labor Progress Note  Patient seen, fetal heart rate and contraction pattern evaluated. Patient states she is more comfortable with epidural in place. Physical Exam:  /70   Pulse 87   Temp 97.7 °F (36.5 °C) (Oral)   Resp 18   SpO2 96%   Cervical Exam: 6/70/-2/posterior/soft  Membranes:  intact  Uterine Activity: Frequency: 4-7 minutes  Fetal Heart Rate: 120 - 130,  adequate variability and reactivity  Accelerations: yes  Decelerations: none    Assessment/Plan:  Reassuring fetal status. Start pitocin    ANA Niño MD

## 2023-05-17 NOTE — OP NOTE
Operative Note    Name: Leroy [facility] Record Number: [id number]   YOB: 1990  Today's Date: May 17, 2023      Pre-operative Diagnosis: 39 4/7 weeks gestation, Oligohydramnios, Non-reassuring fetal tracing    Post-operative Diagnosis: same    Operation: Primary Low Transverse  Section    Surgeon(s):  MD Tita Berg MD    Anesthesia: Epidural    Prophylactic Antibiotics: Ancef and Azithromycin  DVT Prophylaxis: Sequential Compression Devices         Fetal Description: [place]     Birth Information:   Information for the patient's :  Remedios Campo [[id number]]   @[id number]@     Placenta:  manual removal    Specimens: none           Complications:  none    EBL: 1000 cc    Procedure Detail:      Patient was being induced for oligohydramnios. Baby began having variable decelerations to 70-80s which did not respond to IVFs, position changes, stopping Pitocin, amnioinfusion or Terbutaline and FHR was staying in the 80-90s  so decision was made to proceed with PLTCS. FHR was 120s in OR. After proper patient identification and consent, the patient was taken to the operating room, where epidural anesthesia was administered and found to be adequate. Whitaker catheter had been placed using sterile technique. The patient was prepped and draped in the normal sterile fashion with SCDs in place. The abdomen was entered using the Pfannenstiel technique. The peritoneum was entered sharply well superior to the bladder without any apparent injury. The Adam self retaining retractor was placed. The bladder flap was created without difficulty. A low transverse uterine incision was made with the scalpel and extended with blunt finger dissection. The babys head was then delivered atraumatically. The nose and mouth were suctioned. The cord was clamped and cut and the baby was handed off to  staff in attendance.  Placenta was then removed from no

## 2023-05-17 NOTE — PROGRESS NOTES
Labor Progress Note  Patient seen, fetal heart rate and contraction pattern evaluated. Patient admitted for IOL due to oligohydramnios--JOSSELYN 4.5  Also, Factor V Leiden, on Lovenox till 3 days ago. GBS positive  Hx HSV, on Valtrex, no lesions, symptoms  Rh negative    CRB placed at 1700 this afternoon. Patient reports bothersome cramping intermittently. Baby has been active. Physical Exam:  /79   Pulse 74   Temp 98.2 °F (36.8 °C) (Oral)   Resp 18   SpO2 98%   Cervical Exam: not checked  Membranes:  intact  Uterine Activity: Frequency: 2-5 minutes  Fetal Heart Rate: 120 - 130,  adequate variability and reactivity  Accelerations: yes  Decelerations: none    Assessment/Plan:  Reassuring fetal status. Recheck cervix when balloon comes out.     Sweetie Samano MD

## 2023-05-17 NOTE — PROGRESS NOTES
0830: Bedside and Verbal shift change report given to JOSH Feliciano RN (oncoming nurse) by FIORELLA Webb RN (offgoing nurse). Report included the following information Adult Overview, Intake/Output, MAR, and Recent Results. 8358: SVE preformed and IUPC placed by Dr. Mj Thornton. Strip reviewed. Orders received for amnioinfusion. 1003: Amnioinfusion started. 1015: RN at bedside turning patient. Recurrent deceleration in fetal heart rate noted. Dr. Mj Thornton called to bedside. 1018: C section called at this time due to fetal intolerance of labor. Patient agrees with plan of care. 1026: Patient in OR for c section. 36: Live LTCS birth of baby boy by Dr. Mj Thornton. 1300; Dr. Mj Thornton made aware of patient having -150s. 1335: Dr. Mj Thornton made aware of patient's QBL post delivery. 1415: Bedside and Verbal shift change report given to MICH Velasco RN (oncoming nurse) by JOSH Feliciano RN (offgoing nurse). Report included the following information Adult Overview, Surgery Report, MAR, Recent Results, and Med Rec Status.

## 2023-05-17 NOTE — PROGRESS NOTES
Several deep variable decelerations noted  AROM clear fluid, small amount  Placed IUPC and FSE placed without diffiuclty  IUPC returned blood and was removed. Pitocin off  Conservative measures  Discussed possible CS--patient indicates she understands    PCheryl Mtz MD

## 2023-05-17 NOTE — PROGRESS NOTES
Balloon expelled at 2300  Patient denies bleeding, ROM. Baby has been active    /75   Pulse 73   Temp 97.7 °F (36.5 °C) (Oral)   Resp 16   SpO2 96%     Cervix: //-2/posterior/soft/vertex    Start pitocin; plan epidural placement  Continue GBS prophylaxis  Anticipate     ANA Juan MD

## 2023-05-17 NOTE — PROGRESS NOTES
Labor Progress Note  Patient seen, fetal heart rate and contraction pattern evaluated. Patient reports increased cramping, low back pain. Physical Exam:  /65   Pulse 86   Temp 98.4 °F (36.9 °C) (Oral)   Resp 18   SpO2 100%   Cervical Exam: 7/80/-2/posterior/soft/vertex   Membranes:  intact  Uterine Activity: Frequency: 2-5 minutes  Fetal Heart Rate: 120 - 130,  adequate variability and reactivity  Accelerations: yes  Decelerations: intermittent early  Pitocin at 13 miu/min    Assessment/Plan:  Reassuring fetal status. Progression continues  Continue titration pitocin    ANA Marino MD

## 2023-05-17 NOTE — PROGRESS NOTES
2017- took verbal order from Dr. Sabiha Serrano for Nubain order for moderate pain. [facility], Pharmacist placed order in chart to read 10mg Nubain IV every 4 hours for moderate pain.

## 2023-05-17 NOTE — PROGRESS NOTES
Patient feeling pressure  Cx 7/100/-1 vtx  FHR 120s with variable decels to 90s with contractions, moderate variability and accels present. Category 2 tracing  G2P! 39 4/7 wks induction for oligo s/p CRB and AROM, was on Pitocin but stopped due to variable decels, she has continued to have some variable decels.  No cervical change  -IUPC placed and will start amnioifusion  -CEFM  -discussed with patient that if variable decelerations persist and she remains remote from delivery we may need to proceed with PLTCS and she is in agreement

## 2023-05-17 NOTE — ANESTHESIA PROCEDURE NOTES
Epidural Block    Patient location during procedure: OB  Reason for block: labor epidural  Staffing  Performed: anesthesiologist   Epidural  Patient position: sitting  Prep: DuraPrep  Patient monitoring: cardiac monitor, continuous pulse ox and frequent blood pressure checks  Approach: midline  Location: L3-4  Injection technique: THONY air  Provider prep: mask and sterile gloves  Needle  Needle type: Tuohy   Needle length: 6 in  Needle insertion depth: 10 cm  Catheter type: multi-orifice  Catheter at skin depth: 16 cm  Test dose: negativeCatheter Secured: tegaderm and tape  Assessment  Hemodynamics: stable  Attempts: 3+  Outcomes: uncomplicated and patient tolerated procedure well  Additional Notes  Very difficult due to morbid obesity and pt's intolerance of any stimuli despite 3-4 times the normal dose of local at the insertion site  Preanesthetic Checklist  Completed: patient identified, IV checked, site marked, risks and benefits discussed, surgical/procedural consents, equipment checked, pre-op evaluation, timeout performed, anesthesia consent given, oxygen available and monitors applied/VS acknowledged

## 2023-05-17 NOTE — L&D DELIVERY NOTE
Delivery Summary  Patient: Tari Pierce             Circumcision:   desires \"Ayaka\"  Additional Delivery Comments - Patient was admtted at 44 3/7 wks for induction for oligohydramnios. She had CRB overnight and then Pitocin begun and AROM performed. Baby began having variable decelerations to 70-80s which did not respond to IVFs, position changes, stopping Pitocin, amnioinfusion or Terbutaline and FHR was staying in the 80-90s  so decision was made to proceed with PLTCS. Risks of surgery reviewed including bleeding, infection and damage to surrounding organs such as nerves, vessels, bowel, bladder, ureters, uterus, tubes, ovaries and baby. Post-op recovery discussed. FHR was 120s in OR. Information for the patient's :  Maximilian Moreno [[id number]]   @[id number]@     Cord pH:  none    Episiotomy:    Laceration(s):      Estimated Blood Loss (ml): No data found    Labor Events  Method: Oxytocin; Whitaker Bulb (Balloon)      Augmentation: None   Cervical Ripenin2023  5:10 PM  Whitaker/EASI        Operative Vaginal Delivery - none   Emma Olivera [[id number]]      Labor Events     Labor: No  Cervical Ripening Date/Time:  23 17:10:00   Cervical Ripening Type: Whitaker/EASI  Antibiotics Received during Labor: Yes  Rupture Date/Time:  23    Rupture Type: SROM  Fluid Color: Clear  Fluid Odor: None  Fluid Volume: Scant  Induction: Oxytocin, Whitaker Bulb (Balloon)  Augmentation: None  Labor Complications: Fetal Intolerance              Anesthesia    Method: Epidural       Labor Event Times      Labor onset date/time:  23 15:35:00     Dilation complete date/time:        Start pushing date/time:     Decision date/time (emergent ):  2023 10:18:00          Delivery Details      Delivery Date: 23 Delivery Time: 10:37:00   Delivery Type: , Low Transverse  Trial of Labor?: Yes   Categorization: Primary   Priority:

## 2023-05-17 NOTE — PROGRESS NOTES
1910- Bedside and Verbal shift change report given to MICH Deng (oncoming nurse) by JENNIFER Castro (offgoing nurse). Report included the following information Nurse Handoff Report. 1940- patient given heat packs to help with discomfort per patient request.     2015- patient states that she is now at 6/10 pain and wanting something stronger for pain control. 2016- took verbal order from  to given Nubain 10mg IVP every 4 hours PRN. 2017- Spoke to [place]on[place], [facility] and she placed order in patient`s chart. 2032- patient given 10mg IVP Nubain for pain. 2300- sorensen balloon fell out on its own. MD made aware, states will be at bedside soon. 2306- Dr. Chasidy Lucia at bedside for assessment, SVE 6/70/-2, soft and posterior. 2250- patient having episodes of N/V- given 4mg IV Zofran  2320- no relief from 4mg Zofran, per Dr. Chasidy Lucia, [facility] to give 4mg more of Zofran. 2315- IVF bolus started for prep for epidural.    2345- called anesthesia to inform that patient is almost ready for epidural placement, IVF bolus about 20-25 minutes from complete. Dr. Kayla Zamorano states he will be at bedside soon. 2355- Dr. Kayla Zamorano at bedside. Patient positioned to side of the bed, EFM & TOCO adjusted to accommodate sterile field. Difficulty tracing due to maternal position. Time out completed at 2357.     0014-Successful epidural is completed by Dr Kayla Zamorano. Test dose given    0023-Patient is repositioned supine in bed with wedge under left hip. EFM and TOCO replaced and blood pressure frequency increased. Sorensen catheter placed and epidural continuous infusion is started. 65- patient having multiple episodes of n/v order placed to give phenergan, OK by .     0105- Phenergan 12mg IVPB given over 15 minutes. 3711- pitocin started at 1ml/hr    0720- Bedside and Verbal shift change report given to FIORELLA Beach RN (oncoming nurse) by Messi Talbot. Violetta Najera RN (offgoing nurse).  Report included the following information Nurse

## 2023-05-18 LAB
ERYTHROCYTE [DISTWIDTH] IN BLOOD BY AUTOMATED COUNT: 15.7 % (ref 11.5–14.5)
HCT VFR BLD AUTO: 27.7 % (ref 35–47)
HGB BLD-MCNC: 9 G/DL (ref 11.5–16)
MCH RBC QN AUTO: 28.1 PG (ref 26–34)
MCHC RBC AUTO-ENTMCNC: 32.5 G/DL (ref 30–36.5)
MCV RBC AUTO: 86.6 FL (ref 80–99)
NRBC # BLD: 0 K/UL (ref 0–0.01)
NRBC BLD-RTO: 0 PER 100 WBC
PLATELET # BLD AUTO: 144 K/UL (ref 150–400)
PMV BLD AUTO: 11.3 FL (ref 8.9–12.9)
RBC # BLD AUTO: 3.2 M/UL (ref 3.8–5.2)
WBC # BLD AUTO: 11.5 K/UL (ref 3.6–11)

## 2023-05-18 PROCEDURE — 6370000000 HC RX 637 (ALT 250 FOR IP): Performed by: OBSTETRICS & GYNECOLOGY

## 2023-05-18 PROCEDURE — 2580000003 HC RX 258: Performed by: OBSTETRICS & GYNECOLOGY

## 2023-05-18 PROCEDURE — 1120000000 HC RM PRIVATE OB

## 2023-05-18 PROCEDURE — 85027 COMPLETE CBC AUTOMATED: CPT

## 2023-05-18 PROCEDURE — 6360000002 HC RX W HCPCS: Performed by: OBSTETRICS & GYNECOLOGY

## 2023-05-18 PROCEDURE — 36415 COLL VENOUS BLD VENIPUNCTURE: CPT

## 2023-05-18 RX ADMIN — DOCUSATE SODIUM 100 MG: 100 CAPSULE, LIQUID FILLED ORAL at 22:27

## 2023-05-18 RX ADMIN — DOCUSATE SODIUM 100 MG: 100 CAPSULE, LIQUID FILLED ORAL at 08:15

## 2023-05-18 RX ADMIN — ENOXAPARIN SODIUM 30 MG: 100 INJECTION SUBCUTANEOUS at 08:14

## 2023-05-18 RX ADMIN — IBUPROFEN 800 MG: 400 TABLET, FILM COATED ORAL at 15:45

## 2023-05-18 RX ADMIN — ENOXAPARIN SODIUM 30 MG: 100 INJECTION SUBCUTANEOUS at 22:28

## 2023-05-18 RX ADMIN — ACETAMINOPHEN 650 MG: 325 TABLET ORAL at 13:27

## 2023-05-18 RX ADMIN — IBUPROFEN 800 MG: 400 TABLET, FILM COATED ORAL at 06:36

## 2023-05-18 RX ADMIN — SODIUM CHLORIDE, PRESERVATIVE FREE 10 ML: 5 INJECTION INTRAVENOUS at 08:40

## 2023-05-18 RX ADMIN — OXYCODONE 5 MG: 5 TABLET ORAL at 22:27

## 2023-05-18 RX ADMIN — ACETAMINOPHEN 650 MG: 325 TABLET ORAL at 22:28

## 2023-05-18 RX ADMIN — OXYCODONE 5 MG: 5 TABLET ORAL at 16:32

## 2023-05-18 ASSESSMENT — PAIN DESCRIPTION - DESCRIPTORS
DESCRIPTORS: SORE
DESCRIPTORS: CRAMPING

## 2023-05-18 ASSESSMENT — PAIN DESCRIPTION - ORIENTATION
ORIENTATION: LOWER
ORIENTATION: LOWER

## 2023-05-18 ASSESSMENT — PAIN SCALES - GENERAL
PAINLEVEL_OUTOF10: 6
PAINLEVEL_OUTOF10: 0
PAINLEVEL_OUTOF10: 6

## 2023-05-18 ASSESSMENT — PAIN DESCRIPTION - LOCATION
LOCATION: ABDOMEN
LOCATION: ABDOMEN;INCISION

## 2023-05-18 ASSESSMENT — PAIN - FUNCTIONAL ASSESSMENT: PAIN_FUNCTIONAL_ASSESSMENT: ACTIVITIES ARE NOT PREVENTED

## 2023-05-18 NOTE — PROGRESS NOTES
RN got a high blood pressure reading, first time of 160/99, waited 5 minutes got 167/97. RN advised patient to take some deep breaths, we dimmed the lights and then took on left arm 15 minutes later and got 149/96. PT had only had 100 ml of output since 11:45 am and was experienced generalized 1-2+ pitted edema. The pt expressed she was uncomfortable and very swollen. RN upon assessing iv in her L Wrist noticed it was very swollen and tender to the touch at which point RN removed the IV. RN notified provider of situation, he called in 20 mg of lasix IV, advised to continue 125 ml? Hr of LR, and reassess. Labor nurse assessed a new IV in pt right AC.

## 2023-05-18 NOTE — PROGRESS NOTES
Post-Operative  Day 1    [address]     Assessment: Post-Op day 1, stable s/p PLTCS for NRFHTs. Hgb 11.4 > 9.0. Pt with Factor V Leiden, on lovenox for 6wks PP. Plan:     - Routine post-operative care. - The risks and benefits of the circumcision  procedure and anesthesia including: bleeding, infection, variability of cosmetic results were discussed at length with the mother. She is aware that future repeat procedures may be necessary. She gives informed consent to proceed as noted and her questions are answered. - Postop hemoglobin stable. Plan to start Fe at discharge if pt anemic.  - Ambulate today. Information for the patient's :  Janice Brenner [[id number]]   , Low Transverse   Patient doing well without significant complaint. Tolerating diet. Whitaker out. Ambulating. Vitals:  /79 Comment: recheck  Pulse 66   Temp 97.9 °F (36.6 °C) (Oral)   Resp 16   SpO2 98%   Breastfeeding Unknown   Temp (24hrs), Av.1 °F (36.7 °C), Min:97.8 °F (36.6 °C), Max:98.5 °F (36.9 °C)      Last 24hr Input/Output:    Intake/Output Summary (Last 24 hours) at 2023 1121  Last data filed at 2023 1051  Gross per 24 hour   Intake --   Output 3160 ml   Net -3160 ml          Exam:     Patient without distress. Fundus firm, nontender per nursing fundal checks. Incision bandaged, clean, dry, intact. Perineum with normal lochia noted per nursing assessment. Lower extremities are negative for pathological edema.     Labs:   Lab Results   Component Value Date/Time    WBC 11.5 2023 05:01 AM    WBC 16.8 2023 10:04 PM    WBC 11.2 2023 04:29 PM    WBC 12.1 2022 12:00 AM    HGB 9.0 2023 05:01 AM    HGB 9.8 2023 10:04 PM    HGB 11.4 2023 04:29 PM    HGB 13.7 2022 12:00 AM    HCT 27.7 2023 05:01 AM    HCT 31.0 2023 10:04 PM    HCT 36.0 2023 04:29 PM    HCT 42.9

## 2023-05-19 ENCOUNTER — APPOINTMENT (OUTPATIENT)
Facility: HOSPITAL | Age: 33
End: 2023-05-19
Payer: COMMERCIAL

## 2023-05-19 LAB
B PERT DNA SPEC QL NAA+PROBE: NOT DETECTED
BORDETELLA PARAPERTUSSIS BY PCR: NOT DETECTED
C PNEUM DNA SPEC QL NAA+PROBE: NOT DETECTED
FLUAV SUBTYP SPEC NAA+PROBE: NOT DETECTED
FLUBV RNA SPEC QL NAA+PROBE: NOT DETECTED
HADV DNA SPEC QL NAA+PROBE: NOT DETECTED
HCOV 229E RNA SPEC QL NAA+PROBE: NOT DETECTED
HCOV HKU1 RNA SPEC QL NAA+PROBE: NOT DETECTED
HCOV NL63 RNA SPEC QL NAA+PROBE: NOT DETECTED
HCOV OC43 RNA SPEC QL NAA+PROBE: NOT DETECTED
HMPV RNA SPEC QL NAA+PROBE: NOT DETECTED
HPIV1 RNA SPEC QL NAA+PROBE: NOT DETECTED
HPIV2 RNA SPEC QL NAA+PROBE: NOT DETECTED
HPIV3 RNA SPEC QL NAA+PROBE: NOT DETECTED
HPIV4 RNA SPEC QL NAA+PROBE: NOT DETECTED
M PNEUMO DNA SPEC QL NAA+PROBE: NOT DETECTED
RSV RNA SPEC QL NAA+PROBE: NOT DETECTED
RV+EV RNA SPEC QL NAA+PROBE: DETECTED
SARS-COV-2 RNA RESP QL NAA+PROBE: NOT DETECTED

## 2023-05-19 PROCEDURE — 1120000000 HC RM PRIVATE OB

## 2023-05-19 PROCEDURE — 6360000002 HC RX W HCPCS: Performed by: OBSTETRICS & GYNECOLOGY

## 2023-05-19 PROCEDURE — 71046 X-RAY EXAM CHEST 2 VIEWS: CPT

## 2023-05-19 PROCEDURE — 6370000000 HC RX 637 (ALT 250 FOR IP): Performed by: OBSTETRICS & GYNECOLOGY

## 2023-05-19 PROCEDURE — 0202U NFCT DS 22 TRGT SARS-COV-2: CPT

## 2023-05-19 RX ORDER — FUROSEMIDE 40 MG/1
20 TABLET ORAL 2 TIMES DAILY
Status: DISCONTINUED | OUTPATIENT
Start: 2023-05-19 | End: 2023-05-19

## 2023-05-19 RX ORDER — FUROSEMIDE 40 MG/1
20 TABLET ORAL 2 TIMES DAILY
Status: DISCONTINUED | OUTPATIENT
Start: 2023-05-19 | End: 2023-05-20

## 2023-05-19 RX ADMIN — OXYCODONE 5 MG: 5 TABLET ORAL at 10:58

## 2023-05-19 RX ADMIN — IBUPROFEN 800 MG: 400 TABLET, FILM COATED ORAL at 01:08

## 2023-05-19 RX ADMIN — DOCUSATE SODIUM 100 MG: 100 CAPSULE, LIQUID FILLED ORAL at 09:33

## 2023-05-19 RX ADMIN — FUROSEMIDE 20 MG: 40 TABLET ORAL at 13:01

## 2023-05-19 RX ADMIN — ENOXAPARIN SODIUM 30 MG: 100 INJECTION SUBCUTANEOUS at 20:17

## 2023-05-19 RX ADMIN — IBUPROFEN 800 MG: 400 TABLET, FILM COATED ORAL at 09:33

## 2023-05-19 RX ADMIN — IBUPROFEN 800 MG: 400 TABLET, FILM COATED ORAL at 17:31

## 2023-05-19 RX ADMIN — DOCUSATE SODIUM 100 MG: 100 CAPSULE, LIQUID FILLED ORAL at 20:17

## 2023-05-19 RX ADMIN — ENOXAPARIN SODIUM 30 MG: 100 INJECTION SUBCUTANEOUS at 09:33

## 2023-05-19 RX ADMIN — ACETAMINOPHEN 650 MG: 325 TABLET ORAL at 20:17

## 2023-05-19 RX ADMIN — FUROSEMIDE 20 MG: 40 TABLET ORAL at 17:31

## 2023-05-19 RX ADMIN — OXYCODONE 5 MG: 5 TABLET ORAL at 17:31

## 2023-05-19 ASSESSMENT — PAIN - FUNCTIONAL ASSESSMENT
PAIN_FUNCTIONAL_ASSESSMENT: ACTIVITIES ARE NOT PREVENTED

## 2023-05-19 ASSESSMENT — PAIN DESCRIPTION - LOCATION
LOCATION: ABDOMEN;INCISION
LOCATION: ABDOMEN
LOCATION: ABDOMEN;INCISION

## 2023-05-19 ASSESSMENT — PAIN DESCRIPTION - DESCRIPTORS
DESCRIPTORS: ACHING;SORE
DESCRIPTORS: CRAMPING
DESCRIPTORS: ACHING;SORE
DESCRIPTORS: SORE

## 2023-05-19 ASSESSMENT — PAIN SCALES - GENERAL
PAINLEVEL_OUTOF10: 4
PAINLEVEL_OUTOF10: 4
PAINLEVEL_OUTOF10: 6
PAINLEVEL_OUTOF10: 6

## 2023-05-19 ASSESSMENT — PAIN DESCRIPTION - ORIENTATION
ORIENTATION: LOWER
ORIENTATION: LOWER
ORIENTATION: ANTERIOR;LOWER

## 2023-05-19 NOTE — PROGRESS NOTES
Post-Operative  Day 2    [address]     Assessment: Post-Op day 2, s/p 1ltcs for nrfht after IOL for oligo. Factor V leiden mutation on lovenox. Has h/o significant LE edema since previous delivery; unknown etiology. Mild elevation of bp overnight and today. Plan:   - Routine post-operative care. - Plan for discharge Tomorrow. --monitor bp today (already seems improved this am)  --Will try po lasix as LE edema is significant. Information for the patient's :  Caitlyn Sahu [[id number]]   , Low Transverse   Patient doing well without significant complaint. Tolerating regular diet. Ambulating. Voiding without difficulty. Vitals:  /80   Pulse 76   Temp 97.6 °F (36.4 °C) (Oral)   Resp 16   SpO2 97%   Breastfeeding Unknown   Temp (24hrs), Av °F (36.7 °C), Min:97.6 °F (36.4 °C), Max:98.3 °F (36.8 °C)        Exam:       Patient without distress. Fundus firm, nontender per nursing fundal checks. Incision bandaged. Clean, dry, intact. Perineum with normal lochia noted per nursing assessment. Lower extremities with 2+ PE bilaterally    Labs:   Lab Results   Component Value Date/Time    WBC 11.5 2023 05:01 AM    WBC 16.8 2023 10:04 PM    WBC 11.2 2023 04:29 PM    WBC 12.1 2022 12:00 AM    HGB 9.0 2023 05:01 AM    HGB 9.8 2023 10:04 PM    HGB 11.4 2023 04:29 PM    HGB 13.7 2022 12:00 AM    HCT 27.7 2023 05:01 AM    HCT 31.0 2023 10:04 PM    HCT 36.0 2023 04:29 PM    HCT 42.9 2022 12:00 AM     2023 05:01 AM     2023 10:04 PM     2023 04:29 PM     2022 12:00 AM       No results found for this or any previous visit (from the past 24 hour(s)).

## 2023-05-19 NOTE — PROGRESS NOTES
1230: patient called this nurse into her room and patient was tearful at this time and complaining of having a constant cough that started after laying down. Patient says every time she takes a breath she can't get a full deep breath because it then makes her cough. Patient is laying in her bed on left side using pillow for guarding from cough. This nurse will discuss with provider, Dr. Jennifer Smith on for [facility].     [id number]: Dr. Jennifer Smith went ahead and ordered 20mg Lasix BID at this time. 1301: lasix given to patient. 1302: Patient is trying to rest in bed and continues to cough at this time. 1330: Dr. Jennifer Smith wanted to order a chest x-ray and also a nasal respiratory panel swab for patient at this time. 1500: Patient taken down for x-ray with transport.

## 2023-05-20 VITALS
DIASTOLIC BLOOD PRESSURE: 87 MMHG | OXYGEN SATURATION: 100 % | TEMPERATURE: 98 F | HEART RATE: 88 BPM | SYSTOLIC BLOOD PRESSURE: 137 MMHG | RESPIRATION RATE: 17 BRPM

## 2023-05-20 LAB
ANION GAP SERPL CALC-SCNC: 3 MMOL/L (ref 5–15)
BUN SERPL-MCNC: 8 MG/DL (ref 6–20)
BUN/CREAT SERPL: 11 (ref 12–20)
CALCIUM SERPL-MCNC: 8.1 MG/DL (ref 8.5–10.1)
CHLORIDE SERPL-SCNC: 110 MMOL/L (ref 97–108)
CO2 SERPL-SCNC: 25 MMOL/L (ref 21–32)
CREAT SERPL-MCNC: 0.74 MG/DL (ref 0.55–1.02)
GLUCOSE SERPL-MCNC: 90 MG/DL (ref 65–100)
POTASSIUM SERPL-SCNC: 3.9 MMOL/L (ref 3.5–5.1)
SODIUM SERPL-SCNC: 138 MMOL/L (ref 136–145)

## 2023-05-20 PROCEDURE — 36415 COLL VENOUS BLD VENIPUNCTURE: CPT

## 2023-05-20 PROCEDURE — 6370000000 HC RX 637 (ALT 250 FOR IP): Performed by: OBSTETRICS & GYNECOLOGY

## 2023-05-20 PROCEDURE — 6360000002 HC RX W HCPCS: Performed by: OBSTETRICS & GYNECOLOGY

## 2023-05-20 PROCEDURE — 80048 BASIC METABOLIC PNL TOTAL CA: CPT

## 2023-05-20 RX ORDER — FUROSEMIDE 40 MG/1
40 TABLET ORAL 2 TIMES DAILY
Qty: 60 TABLET | Refills: 3 | Status: SHIPPED | OUTPATIENT
Start: 2023-05-20

## 2023-05-20 RX ORDER — ENOXAPARIN SODIUM 100 MG/ML
40 INJECTION SUBCUTANEOUS DAILY
Qty: 30 EACH | Refills: 2 | Status: SHIPPED | OUTPATIENT
Start: 2023-05-20

## 2023-05-20 RX ORDER — IBUPROFEN 800 MG/1
800 TABLET ORAL EVERY 8 HOURS PRN
Qty: 30 TABLET | Refills: 1 | Status: SHIPPED | OUTPATIENT
Start: 2023-05-20

## 2023-05-20 RX ORDER — OXYCODONE HYDROCHLORIDE 5 MG/1
5 TABLET ORAL EVERY 6 HOURS PRN
Qty: 20 TABLET | Refills: 0 | Status: SHIPPED | OUTPATIENT
Start: 2023-05-20 | End: 2023-05-25

## 2023-05-20 RX ORDER — FUROSEMIDE 40 MG/1
40 TABLET ORAL 2 TIMES DAILY
Status: DISCONTINUED | OUTPATIENT
Start: 2023-05-20 | End: 2023-05-20 | Stop reason: HOSPADM

## 2023-05-20 RX ADMIN — ACETAMINOPHEN 650 MG: 325 TABLET ORAL at 08:53

## 2023-05-20 RX ADMIN — IBUPROFEN 800 MG: 400 TABLET, FILM COATED ORAL at 08:52

## 2023-05-20 RX ADMIN — ENOXAPARIN SODIUM 30 MG: 100 INJECTION SUBCUTANEOUS at 08:52

## 2023-05-20 RX ADMIN — DOCUSATE SODIUM 100 MG: 100 CAPSULE, LIQUID FILLED ORAL at 08:52

## 2023-05-20 RX ADMIN — IBUPROFEN 800 MG: 400 TABLET, FILM COATED ORAL at 00:27

## 2023-05-20 RX ADMIN — FUROSEMIDE 40 MG: 40 TABLET ORAL at 08:52

## 2023-05-20 RX ADMIN — ACETAMINOPHEN 650 MG: 325 TABLET ORAL at 00:27

## 2023-05-20 NOTE — PROGRESS NOTES
Post-Operative  Day 3    [address]       Assessment: Post-Op day 3, doing well s/p 1ltcs for nrfht after IOL for oligo. Factor V leiden mutation on lovenox. Has h/o significant LE edema since previous delivery; unknown etiology. started on lasix yesterday with normal  but not increased UOP. No longer having sob or wheezing. Plan:   --check bmp to look at potassium and then increase lasix to 40 mg bid. - Discharge home today. - Follow up in office in 1 week(s) with [facility].  - Pain medication prescription(s) sent. - Questions answered. Information for the patient's :  Candy Cui [[id number]]   , Low Transverse  Patient doing well without significant complaint. Tolerating regular diet. Ambulating. Voiding without difficulty. Vitals:  /87   Pulse 88   Temp 98 °F (36.7 °C) (Oral)   Resp 17   SpO2 100%   Breastfeeding Unknown   Temp (24hrs), Av.1 °F (36.7 °C), Min:98 °F (36.7 °C), Max:98.4 °F (36.9 °C)        Exam:       Patient without distress. Fundus firm, nontender per nursing fundal checks. Clean, dry, intact. Perineum with normal lochia noted per nursing assessment.                 Lower extremities with slightly improved LE edema    Labs:   Lab Results   Component Value Date/Time    WBC 11.5 2023 05:01 AM    WBC 16.8 2023 10:04 PM    WBC 11.2 2023 04:29 PM    WBC 12.1 2022 12:00 AM    HGB 9.0 2023 05:01 AM    HGB 9.8 2023 10:04 PM    HGB 11.4 2023 04:29 PM    HGB 13.7 2022 12:00 AM    HCT 27.7 2023 05:01 AM    HCT 31.0 2023 10:04 PM    HCT 36.0 2023 04:29 PM    HCT 42.9 2022 12:00 AM     2023 05:01 AM     2023 10:04 PM     2023 04:29 PM     2022 12:00 AM       Recent Results (from the past 24 hour(s))   Respiratory Panel, Molecular, with

## 2023-05-20 NOTE — PROGRESS NOTES
Patient discharged from unit with all supplies and understanding of discharge teaching. Patient had no other concerns or complaints at this time.

## 2023-05-20 NOTE — DISCHARGE SUMMARY
Obstetrical Discharge Summary     Name: Young Vieira MRN: [id number]  SSN: [id number]    YOB: 1990  Age: 34 y.o. Sex: female      Admit Date: 2023    Discharge Date: 2023     Admitting Physician: Jose Alberto Sheikh MD     Attending Physician:  Krzysztof Ortega MD     Admission Diagnoses: Term pregnancy [Z34.90]    Discharge Diagnoses:   Information for the patient's :  Hilda Mei [[id number]]   @[phone]@      Additional Diagnoses:  No components found for: [place] [address], [facility] Course: Postpartum course was complicated by increased edema, cough, wheezing and sob, which added 0 days to the patient's length of stay. Improved with oral lasix and incentive spirometer. Patient Instructions:   Current Discharge Medication List        START taking these medications    Details   ibuprofen (ADVIL;MOTRIN) 800 MG tablet Take 1 tablet by mouth every 8 hours as needed for Pain  Qty: 30 tablet, Refills: 1      oxyCODONE (ROXICODONE) 5 MG immediate release tablet Take 1 tablet by mouth every 6 hours as needed for Pain for up to 5 days. Max Daily Amount: 20 mg  Qty: 20 tablet, Refills: 0    Comments: Reduce doses taken as pain becomes manageable  Associated Diagnoses: Post-op pain           CONTINUE these medications which have CHANGED    Details   furosemide (LASIX) 40 MG tablet Take 1 tablet by mouth in the morning and 1 tablet in the evening.   Qty: 60 tablet, Refills: 3      enoxaparin (LOVENOX) 40 MG/0.4ML Inject 0.4 mLs into the skin daily  Qty: 30 each, Refills: 2           CONTINUE these medications which have NOT CHANGED    Details   ferrous sulfate (SLOW FE) 142 (45 Fe) MG extended release tablet Take 142 mg by mouth daily           STOP taking these medications       aspirin 81 MG chewable tablet Comments:   Reason for Stopping:         Drospirenone (SLYND) 4 MG TABS Comments:   Reason for Stopping:

## 2023-05-20 NOTE — DISCHARGE INSTRUCTIONS
or friends, if you can. Don't try to do it all yourself. If you have hemorrhoids or swelling or pain around the opening of your vagina, try using cold and heat. You can put ice or a cold pack on the area for 10 to 20 minutes at a time. Put a thin cloth between the ice and your skin. Also try sitting in a few inches of warm water (sitz bath) 3 times a day and after bowel movements. Take pain medicines exactly as directed. If the doctor gave you a prescription medicine for pain, take it as prescribed. If you are not taking a prescription pain medicine, ask your doctor if you can take an over-the-counter medicine. Eat more fiber to avoid constipation. Include foods such as whole-grain breads and cereals, raw vegetables, raw and dried fruits, and beans. Drink plenty of fluids. If you have kidney, heart, or liver disease and have to limit fluids, talk with your doctor before you increase the amount of fluids you drink. Do not rinse inside your vagina with fluids (douche). If you have stitches, keep the area clean by pouring or spraying warm water over the area outside your vagina and anus after you use the toilet. Keep a list of questions to ask your doctor or midwife. Your questions might be about:  Changes in your breasts, such as lumps or soreness. When to expect your menstrual period to start again. What form of birth control is best for you. Weight you have put on during the pregnancy. Exercise options. What foods and drinks are best for you, especially if you are breastfeeding. Problems you might be having with breastfeeding. When you can have sex. You may want to talk about lubricants for your vagina. Any feelings of sadness or restlessness that you are having. When should you call for help? Share this information with your partner, family, or a friend. They can help you watch for warning signs. Call 911  anytime you think you may need emergency care.  For example, call if:    You have thoughts

## 2025-04-27 NOTE — PROGRESS NOTES
Kelsie Olivera is a 35 y.o. female patient of Dr Nj who presents with concern of lower extremity swelling and weight gain.    Has 2 children Complicated birth of son in May 2023.  Reports that since that time has been unable to lose weight.  Has had lower extremity swelling.  Previously took Lasix for edema, off now.    Reports walking 2 miles a day since Feb 6.  Has made diet changes.  Reviewed diet:  breakfast- coffee, lunch- meal replacement shake like slimfast, dinner- 6pm- grilled protein, veggies, rice. Sometimes skips carbs. Drinking mostly water. No weight loss so far.      Concerned regarding hair growth, acne, inability to lose weight.  Was referred to Dr. Lima Staples, endocrinology for evaluation for PCOS in the past.  Was seen September 2022, labs ordered however labs not done.        Past Medical History:   Diagnosis Date    Anemia     w. pregnancy    Factor V Leiden     Fluid retention in legs     Herpes simplex virus (HSV) infection     PCOS (polycystic ovarian syndrome)        Family History   Problem Relation Age of Onset    Hypertension Mother     High Cholesterol Mother     Clotting Disorder Father     Cancer Maternal Grandmother         Dr. Robinson hawthorne was her dr.    Heart Attack Maternal Grandmother     Heart Disease Maternal Grandmother     High Blood Pressure Maternal Grandmother     High Cholesterol Maternal Grandmother     Stroke Maternal Grandmother     Kidney Disease Paternal Grandmother     Stroke Paternal Grandmother     Miscarriages / Stillbirths Maternal Aunt         Social History     Socioeconomic History    Marital status:      Spouse name: Not on file    Number of children: Not on file    Years of education: Not on file    Highest education level: Not on file   Occupational History    Not on file   Tobacco Use    Smoking status: Never    Smokeless tobacco: Never   Vaping Use    Vaping status: Never Used   Substance and Sexual Activity    Alcohol use: No    Drug use:

## 2025-04-28 ENCOUNTER — OFFICE VISIT (OUTPATIENT)
Age: 35
End: 2025-04-28
Payer: COMMERCIAL

## 2025-04-28 VITALS
RESPIRATION RATE: 16 BRPM | HEART RATE: 83 BPM | SYSTOLIC BLOOD PRESSURE: 123 MMHG | DIASTOLIC BLOOD PRESSURE: 81 MMHG | WEIGHT: 266 LBS | BODY MASS INDEX: 39.4 KG/M2 | HEIGHT: 69 IN | TEMPERATURE: 98.4 F | OXYGEN SATURATION: 99 %

## 2025-04-28 DIAGNOSIS — Z13.220 SCREENING FOR HYPERLIPIDEMIA: ICD-10-CM

## 2025-04-28 DIAGNOSIS — R60.9 EDEMA, UNSPECIFIED TYPE: ICD-10-CM

## 2025-04-28 DIAGNOSIS — E28.2 PCOS (POLYCYSTIC OVARIAN SYNDROME): ICD-10-CM

## 2025-04-28 DIAGNOSIS — R60.9 EDEMA, UNSPECIFIED TYPE: Primary | ICD-10-CM

## 2025-04-28 DIAGNOSIS — E66.812 CLASS 2 OBESITY WITHOUT SERIOUS COMORBIDITY WITH BODY MASS INDEX (BMI) OF 39.0 TO 39.9 IN ADULT, UNSPECIFIED OBESITY TYPE: ICD-10-CM

## 2025-04-28 PROCEDURE — 99214 OFFICE O/P EST MOD 30 MIN: CPT | Performed by: FAMILY MEDICINE

## 2025-04-28 RX ORDER — POTASSIUM CHLORIDE 1500 MG/1
20 TABLET, EXTENDED RELEASE ORAL DAILY
Qty: 30 TABLET | Refills: 1 | Status: SHIPPED | OUTPATIENT
Start: 2025-04-28

## 2025-04-28 RX ORDER — FUROSEMIDE 20 MG/1
20-40 TABLET ORAL DAILY
Qty: 60 TABLET | Refills: 1 | Status: SHIPPED | OUTPATIENT
Start: 2025-04-28

## 2025-04-28 SDOH — ECONOMIC STABILITY: FOOD INSECURITY: WITHIN THE PAST 12 MONTHS, YOU WORRIED THAT YOUR FOOD WOULD RUN OUT BEFORE YOU GOT MONEY TO BUY MORE.: NEVER TRUE

## 2025-04-28 SDOH — ECONOMIC STABILITY: INCOME INSECURITY: IN THE LAST 12 MONTHS, WAS THERE A TIME WHEN YOU WERE NOT ABLE TO PAY THE MORTGAGE OR RENT ON TIME?: NO

## 2025-04-28 SDOH — ECONOMIC STABILITY: FOOD INSECURITY: WITHIN THE PAST 12 MONTHS, THE FOOD YOU BOUGHT JUST DIDN'T LAST AND YOU DIDN'T HAVE MONEY TO GET MORE.: NEVER TRUE

## 2025-04-28 SDOH — ECONOMIC STABILITY: TRANSPORTATION INSECURITY
IN THE PAST 12 MONTHS, HAS LACK OF TRANSPORTATION KEPT YOU FROM MEETINGS, WORK, OR FROM GETTING THINGS NEEDED FOR DAILY LIVING?: NO

## 2025-04-28 SDOH — ECONOMIC STABILITY: TRANSPORTATION INSECURITY
IN THE PAST 12 MONTHS, HAS THE LACK OF TRANSPORTATION KEPT YOU FROM MEDICAL APPOINTMENTS OR FROM GETTING MEDICATIONS?: NO

## 2025-04-28 ASSESSMENT — PATIENT HEALTH QUESTIONNAIRE - PHQ9
SUM OF ALL RESPONSES TO PHQ QUESTIONS 1-9: 0
2. FEELING DOWN, DEPRESSED OR HOPELESS: NOT AT ALL
1. LITTLE INTEREST OR PLEASURE IN DOING THINGS: NOT AT ALL
SUM OF ALL RESPONSES TO PHQ QUESTIONS 1-9: 0

## 2025-04-28 NOTE — PATIENT INSTRUCTIONS
Lasix 40mg (2 x 20mg) once in the morning for up to 3 days, then reduce to 20mg daily in the morning.  Take potassium when you take the 40mg dose

## 2025-04-29 DIAGNOSIS — E66.812 CLASS 2 OBESITY WITHOUT SERIOUS COMORBIDITY WITH BODY MASS INDEX (BMI) OF 39.0 TO 39.9 IN ADULT, UNSPECIFIED OBESITY TYPE: ICD-10-CM

## 2025-04-29 DIAGNOSIS — E28.2 PCOS (POLYCYSTIC OVARIAN SYNDROME): ICD-10-CM

## 2025-04-29 DIAGNOSIS — Z13.220 SCREENING FOR HYPERLIPIDEMIA: ICD-10-CM

## 2025-04-29 RX ORDER — FUROSEMIDE 20 MG/1
TABLET ORAL
Qty: 180 TABLET | OUTPATIENT
Start: 2025-04-29

## 2025-04-30 LAB
ALBUMIN SERPL-MCNC: 4.1 G/DL (ref 3.9–4.9)
ALP SERPL-CCNC: 86 IU/L (ref 44–121)
ALT SERPL-CCNC: 17 IU/L (ref 0–32)
AST SERPL-CCNC: 13 IU/L (ref 0–40)
BASOPHILS # BLD AUTO: 0 X10E3/UL (ref 0–0.2)
BASOPHILS NFR BLD AUTO: 0 %
BILIRUB SERPL-MCNC: <0.2 MG/DL (ref 0–1.2)
BUN SERPL-MCNC: 13 MG/DL (ref 6–20)
BUN/CREAT SERPL: 17 (ref 9–23)
CALCIUM SERPL-MCNC: 9 MG/DL (ref 8.7–10.2)
CHLORIDE SERPL-SCNC: 105 MMOL/L (ref 96–106)
CHOLEST SERPL-MCNC: 171 MG/DL (ref 100–199)
CO2 SERPL-SCNC: 24 MMOL/L (ref 20–29)
CORTIS AM PEAK SERPL-MCNC: 12.8 UG/DL (ref 6.2–19.4)
CREAT SERPL-MCNC: 0.76 MG/DL (ref 0.57–1)
DHEA-S SERPL-MCNC: 108 UG/DL (ref 84.8–378)
EGFRCR SERPLBLD CKD-EPI 2021: 106 ML/MIN/1.73
EOSINOPHIL # BLD AUTO: 0.2 X10E3/UL (ref 0–0.4)
EOSINOPHIL NFR BLD AUTO: 3 %
ERYTHROCYTE [DISTWIDTH] IN BLOOD BY AUTOMATED COUNT: 13.1 % (ref 11.7–15.4)
GLOBULIN SER CALC-MCNC: 2.9 G/DL (ref 1.5–4.5)
GLUCOSE SERPL-MCNC: 90 MG/DL (ref 70–99)
HBA1C MFR BLD: 5.2 % (ref 4.8–5.6)
HCT VFR BLD AUTO: 39.7 % (ref 34–46.6)
HDLC SERPL-MCNC: 39 MG/DL
HGB BLD-MCNC: 12.2 G/DL (ref 11.1–15.9)
IMM GRANULOCYTES # BLD AUTO: 0 X10E3/UL (ref 0–0.1)
IMM GRANULOCYTES NFR BLD AUTO: 0 %
LDLC SERPL CALC-MCNC: 113 MG/DL (ref 0–99)
LYMPHOCYTES # BLD AUTO: 1.7 X10E3/UL (ref 0.7–3.1)
LYMPHOCYTES NFR BLD AUTO: 25 %
MCH RBC QN AUTO: 26.2 PG (ref 26.6–33)
MCHC RBC AUTO-ENTMCNC: 30.7 G/DL (ref 31.5–35.7)
MCV RBC AUTO: 85 FL (ref 79–97)
MONOCYTES # BLD AUTO: 0.4 X10E3/UL (ref 0.1–0.9)
MONOCYTES NFR BLD AUTO: 5 %
NEUTROPHILS # BLD AUTO: 4.6 X10E3/UL (ref 1.4–7)
NEUTROPHILS NFR BLD AUTO: 67 %
PLATELET # BLD AUTO: 266 X10E3/UL (ref 150–450)
POTASSIUM SERPL-SCNC: 4.4 MMOL/L (ref 3.5–5.2)
PROLACTIN SERPL-MCNC: 11.9 NG/ML (ref 4.8–33.4)
PROT SERPL-MCNC: 7 G/DL (ref 6–8.5)
RBC # BLD AUTO: 4.66 X10E6/UL (ref 3.77–5.28)
SODIUM SERPL-SCNC: 142 MMOL/L (ref 134–144)
T4 FREE SERPL-MCNC: 0.95 NG/DL (ref 0.82–1.77)
TRIGL SERPL-MCNC: 102 MG/DL (ref 0–149)
TSH SERPL DL<=0.005 MIU/L-ACNC: 2.56 UIU/ML (ref 0.45–4.5)
VLDLC SERPL CALC-MCNC: 19 MG/DL (ref 5–40)
WBC # BLD AUTO: 6.8 X10E3/UL (ref 3.4–10.8)

## 2025-05-01 LAB
TESTOST FREE SERPL-MCNC: 1.1 PG/ML (ref 0–4.2)
TESTOST SERPL-MCNC: 14 NG/DL (ref 8–60)

## 2025-05-02 LAB — 17OHP SERPL-MCNC: 30 NG/DL

## 2025-05-03 LAB
ALDOST SERPL-MCNC: 7.8 NG/DL (ref 0–30)
ALDOST/RENIN PLAS-RTO: 11.6 {RATIO} (ref 0–30)
RENIN PLAS-CCNC: 0.67 NG/ML/HR (ref 0.17–5.38)

## 2025-07-13 DIAGNOSIS — R60.9 EDEMA, UNSPECIFIED TYPE: ICD-10-CM

## 2025-07-14 RX ORDER — FUROSEMIDE 20 MG/1
TABLET ORAL
Qty: 180 TABLET | Refills: 1 | Status: SHIPPED | OUTPATIENT
Start: 2025-07-14

## (undated) DEVICE — STRAP,POSITIONING,KNEE/BODY,FOAM,4X60": Brand: MEDLINE

## (undated) DEVICE — SUTURE MAXON SZ 0 L36IN ABSRB GRN SZ GS-21 L37MM 1/2 CIR 8886626961

## (undated) DEVICE — SOLIDIFIER FLD 2OZ 1500CC N DISINF IN BTL DISP SAFESORB

## (undated) DEVICE — PENCIL ES L3M BTTN SWCH S STL HEX LOK BLDE ELECTRD HOLSTER

## (undated) DEVICE — CLEANER ES TIP W2XL2IN ADH BK RADPQ FOR S STL ELECTRD

## (undated) DEVICE — SOLUTION IV 1000ML 0.9% SOD CHL

## (undated) DEVICE — MEDI-VAC NON-CONDUCTIVE SUCTION TUBING: Brand: CARDINAL HEALTH

## (undated) DEVICE — APPLICATOR MEDICATED 26 CC SOLUTION HI LT ORNG CHLORAPREP

## (undated) DEVICE — SUTURE VCRL SZ 0 L36IN ABSRB VLT L40MM CT 1/2 CIR J358H

## (undated) DEVICE — ELECTRODE PT RET AD L9FT HI MOIST COND ADH HYDRGEL CORDED

## (undated) DEVICE — 3000CC GUARDIAN II: Brand: GUARDIAN

## (undated) DEVICE — STERILE POLYISOPRENE POWDER-FREE SURGICAL GLOVES: Brand: PROTEXIS

## (undated) DEVICE — STAPLER SKIN SQ 30 ABSRB STPL DISP INSORB ORDER VIA PHONE OR EMAIL

## (undated) DEVICE — LARGE, DISPOSABLE ALEXIS O C-SECTION PROTECTOR - RETRACTOR: Brand: ALEXIS ® O C-SECTION PROTECTOR - RETRACTOR

## (undated) DEVICE — SUTURE VCRL 3-0 L36IN ABSRB VLT CT-1 L36MM 1/2 CIR J344H

## (undated) DEVICE — POOLE SUCTION INSTRUMENT WITH REMOVABLE SHEATH: Brand: POOLE

## (undated) DEVICE — C-SECTION II-LF: Brand: MEDLINE INDUSTRIES, INC.

## (undated) DEVICE — BAG COUNTING SPONGE 5/10 POCKET BLUE DEVON NONST